# Patient Record
Sex: FEMALE | Race: WHITE | Employment: STUDENT | URBAN - METROPOLITAN AREA
[De-identification: names, ages, dates, MRNs, and addresses within clinical notes are randomized per-mention and may not be internally consistent; named-entity substitution may affect disease eponyms.]

---

## 2018-09-21 ENCOUNTER — OFFICE VISIT (OUTPATIENT)
Dept: FAMILY MEDICINE CLINIC | Age: 23
End: 2018-09-21

## 2018-09-21 VITALS
HEART RATE: 62 BPM | TEMPERATURE: 98.4 F | HEIGHT: 66 IN | DIASTOLIC BLOOD PRESSURE: 66 MMHG | BODY MASS INDEX: 21.69 KG/M2 | OXYGEN SATURATION: 98 % | RESPIRATION RATE: 18 BRPM | WEIGHT: 135 LBS | SYSTOLIC BLOOD PRESSURE: 88 MMHG

## 2018-09-21 DIAGNOSIS — F41.9 ANXIETY AND DEPRESSION: ICD-10-CM

## 2018-09-21 DIAGNOSIS — D69.0 HENOCH-SCHONLEIN PURPURA (HCC): ICD-10-CM

## 2018-09-21 DIAGNOSIS — Z23 ENCOUNTER FOR IMMUNIZATION: ICD-10-CM

## 2018-09-21 DIAGNOSIS — G43.809 OTHER MIGRAINE WITHOUT STATUS MIGRAINOSUS, NOT INTRACTABLE: ICD-10-CM

## 2018-09-21 DIAGNOSIS — F32.A ANXIETY AND DEPRESSION: ICD-10-CM

## 2018-09-21 DIAGNOSIS — R94.6 BORDERLINE ABNORMAL TFTS: Primary | ICD-10-CM

## 2018-09-21 PROBLEM — G43.909 MIGRAINE: Status: ACTIVE | Noted: 2018-09-21

## 2018-09-21 RX ORDER — BUPROPION HYDROCHLORIDE 150 MG/1
150 TABLET ORAL
COMMUNITY
End: 2019-12-13

## 2018-09-21 RX ORDER — KETOROLAC TROMETHAMINE 10 MG/1
TABLET, FILM COATED ORAL
COMMUNITY
Start: 2015-03-27 | End: 2018-09-21

## 2018-09-21 RX ORDER — BUPROPION HYDROCHLORIDE 100 MG/1
150 TABLET ORAL DAILY
COMMUNITY
End: 2018-09-21

## 2018-09-21 RX ORDER — SUMATRIPTAN 25 MG/1
25 TABLET, FILM COATED ORAL
Qty: 9 TAB | Refills: 1 | Status: SHIPPED | OUTPATIENT
Start: 2018-09-21 | End: 2018-09-21

## 2018-09-21 RX ORDER — SUMATRIPTAN 25 MG/1
25 TABLET, FILM COATED ORAL
COMMUNITY
End: 2018-09-21 | Stop reason: SDUPTHER

## 2018-09-21 NOTE — ASSESSMENT & PLAN NOTE
resolved No results found for: WBC, WBCT, WBCPOC, HGB, HGBPOC, HCT, HCTPOC, PLT, PLTPOC, CREA, CREAPOC, BUN, IBUN, BUNPOC, K, KPOCT, INR, PTP, PTINR, PTEXT, HGBEXT, HCTEXT, PLTEXT, PTEXT

## 2018-09-21 NOTE — PROGRESS NOTES
Patient Name: Umang MRN: [id number] SUBJECTIVE Umang is a 24 y.o. female who presents with the following: Here to establish care with new PCP. Patient has recently relocated from [place] to [place]. Previously in [place] for [address]. She had a hypothyroid workup in the past year after having abnormal T3 and T4 but normal TSH. She had a normal FSH/LH and prolactin. Subsequently she had an MRI of the brain done on 1/18/18 which was within normal limits regarding the pituitary. He does report some intermittent fatigue but is unsure if that is due to thyroid issues or dizzy work life schedule. Recently had cholesterol and diabetes screening done in the past year. Has upcoming GYN appointment this afternoon. Has a history of irregular menses. Currently doing well on Wellbutrin 150 mg daily for her anxiety/depression. Does have a history of migraines which is well controlled with as needed Imitrex, which she takes about 3 times a month. Review of Systems Constitutional: Positive for malaise/fatigue. Negative for chills, fever and weight loss. HENT: Negative for hearing loss, nosebleeds and sore throat. Respiratory: Negative for cough, sputum production, shortness of breath and wheezing. Cardiovascular: Negative for chest pain, palpitations, leg swelling and PND. Gastrointestinal: Negative for abdominal pain, blood in stool, constipation, diarrhea, nausea and vomiting. Genitourinary: Negative for dysuria, frequency and urgency. Musculoskeletal: Negative for back pain, falls, joint pain, myalgias and neck pain. Skin: Negative for itching and rash. Neurological: Negative for dizziness, sensory change, focal weakness and loss of consciousness. Psychiatric/Behavioral: Negative for depression. The patient is not nervous/anxious. All other systems reviewed and are negative. The patient's medications, allergies, past medical history, surgical history, family history and social history were reviewed and updated where appropriate. Prior to Admission medications Medication Sig Start Date End Date Taking? Authorizing Provider buPROPion (WELLBUTRIN) 100 mg tablet Take 150 mg by mouth daily. Yes Historical Provider SUMAtriptan (IMITREX) 25 mg tablet Take 25 mg by mouth once as needed for Migraine. Yes Historical Provider No Known Allergies Past Medical History:  
Diagnosis Date  Acne 3/9/2015 Dermatologist Dr Howard Dumont middle and high school (BCP's x 3 yrs); Accutane 2010 x 6 months  Anxiety and depression 9/21/2018  H/O Henoch-Schonlein purpura 12/30/2014 Reaction to strep at age 9 yo  Irregular menses 12/30/2014 Followed by Tangela Capone  Migraine 9/21/2018 Past Surgical History:  
Procedure Laterality Date  HX TONSILLECTOMY  2004 Age 7  
 HX WISDOM TEETH EXTRACTION Bilateral 7/2014 Family History Problem Relation Age of Onset  No Known Problems Mother  No Known Problems Father  Diabetes Maternal Grandmother   
  type 2  
 Colon Cancer Maternal Grandmother   
  diagnosed in her 63's, survivor  Thyroid Disease Maternal Grandmother   
  hypothyroid  Colon Cancer Paternal Aunt   
  diagnosed in her 63's, survivor Social History Social History  Marital status: SINGLE Spouse name: N/A  
 Number of children: 0  
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  
 Sexual activity: Yes  
  Partners: Male Birth control/ protection: Condom Other Topics Concern  Caffeine Concern No  
 Weight Concern No  
  stable since beginning of 2014, maintaining; has intentionally lost 15 lbs since starting college in 2012  Special Diet Yes  
  gluten free diet since 6/2013  Exercise Yes  
  2-3 x week: runs or other cardio Social History Narrative Previous Peds Dr Amy Santillan OBJECTIVE Visit Vitals  BP (!) 88/66 (BP 1 Location: Right arm, BP Patient Position: Sitting)  Pulse 62  Temp 98.4 °F (36.9 °C) (Oral)  Resp 18  Ht 5' 5.5\" (1.664 m)  Wt 135 lb (61.2 kg)  SpO2 98%  BMI 22.12 kg/m2 Physical Exam  
Constitutional: She is oriented to person, place, and time and well-developed, well-nourished, and in no distress. No distress. Eyes: Conjunctivae and EOM are normal. Pupils are equal, round, and reactive to light. Neck: Normal range of motion. Neck supple. No thyromegaly present. Cardiovascular: Normal rate, regular rhythm and normal heart sounds. Exam reveals no gallop and no friction rub. No murmur heard. Pulmonary/Chest: Effort normal and breath sounds normal. No respiratory distress. She has no wheezes. Neurological: She is alert and oriented to person, place, and time. Skin: Skin is warm and dry. No rash noted. She is not diaphoretic. Psychiatric: Mood, memory, affect and judgment normal.  
Nursing note and vitals reviewed. ASSESSMENT AND PLAN Annamarie Dorsey is a 24 y.o. female who presents today for: 
 
1. Borderline abnormal TFTs Stable, continue current treatment pending review of labs. - TSH AND FREE T4 
- T3 TOTAL 
- THYROID ANTIBODY PANEL 2. Other migraine without status migrainosus, not intractable Stable, continue current treatment. 
- SUMAtriptan (IMITREX) 25 mg tablet; Take 1 Tab by mouth once as needed for Migraine for up to 1 dose. Dispense: 9 Tab; Refill: 1 3. Anxiety and depression Stable, continue current treatment. - buPROPion XL (WELLBUTRIN XL) 150 mg tablet; Take 150 mg by mouth every morning. 4. Encounter for immunization - Influenza virus vaccine (QUADRIVALENT PRES FREE SYRINGE) IM (33751) - [place] IMMUNIZ ADMIN,1 SINGLE/COMB VAC/TOXOID Medications Discontinued During This Encounter Medication Reason  norgestimate-ethinyl estradiol (ORTHO TRI-CYCLEN LO, 28,) 0.18/0.215/0.25 mg-25 mcg (28) tablet Not A Current Medication  ketorolac (TORADOL) 10 mg tablet Not A Current Medication  buPROPion (WELLBUTRIN) 100 mg tablet Not A Current Medication  SUMAtriptan (IMITREX) 25 mg tablet Reorder Follow-up Disposition: 
Return in about 1 year (around 9/21/2019) for CPE (30 min). Medication risks/benefits/costs/interactions/alternatives discussed with patient. Advised patient to call back or return to office if symptoms worsen/change/persist. If patient cannot reach us or should anything more severe/urgent arise he/she should proceed directly to the nearest emergency department. Discussed expected course/resolution/complications of diagnosis in detail with patient. Patient given a written after visit summary which includes his/her diagnoses, current medications and vitals. Patient expressed understanding with the diagnosis and plan. Coni Dang M.D. Diagnoses and all orders for this visit: 1. Borderline abnormal TFTs 
-     TSH AND FREE T4 
-     T3 TOTAL 
-     THYROID ANTIBODY PANEL 2. Other migraine without status migrainosus, not intractable -     SUMAtriptan (IMITREX) 25 mg tablet; Take 1 Tab by mouth once as needed for Migraine for up to 1 dose. 3. Anxiety and depression 4. Encounter for immunization -     Influenza virus vaccine (QUADRIVALENT PRES FREE SYRINGE) IM (29495) -     [place] IMMUNIZ ADMIN,1 SINGLE/COMB VAC/TOXOID 5. H/O Henoch-Schonlein Purpura Assessment & Plan: 
resolved No results found for: WBC, WBCT, WBCPOC, HGB, HGBPOC, HCT, HCTPOC, PLT, PLTPOC, CREA, CREAPOC, BUN, IBUN, BUNPOC, K, KPOCT, INR, PTP, PTINR, PTEXT, HGBEXT, HCTEXT, PLTEXT, PTEXT

## 2018-09-21 NOTE — PROGRESS NOTES
Chief Complaint Patient presents with [address] [place] [address]  Thyroid Problem  
  follow up 1. Have you been to the ER, urgent care clinic since your last visit? Hospitalized since your last visit? No 
 
2. Have you seen or consulted any other health care providers outside of the [address] [place] [address] since your last visit? Include any pap smears or colon screening. No 
Patient stated that she has a pap today at [facility].

## 2018-09-21 NOTE — MR AVS SNAPSHOT
[address] 
[phone] Patient: Patricia Aden MRN: [id number] MKY:[id number] Visit Information Date & Time Provider Department Dept. Phone Encounter #  
 9/21/2018 10:00 AM Donaldo Wilde MD [address] [place] [address] [phone] [id number] Upcoming Health Maintenance Date Due  
 HPV Age 9Y-34Y (1 of 1 - Female 3 Dose Series) 3/1/2005 DTaP/Tdap/Td series (1 - Tdap) 3/1/2015 PAP AKA CERVICAL CYTOLOGY 3/1/2015 Allergies as of 9/21/2018  Review Complete On: 9/21/2018 By: Donaldo Wilde MD  
 No Known Allergies Current Immunizations  Reviewed on 12/30/2014 Name Date Influenza Vaccine 11/17/2014 Influenza Vaccine (Quad) PF 9/21/2018 Not reviewed this visit You Were Diagnosed With   
  
 Codes Comments Encounter for immunization    -  Primary ICD-10-CM: Q66 ICD-9-CM: V03.89 Borderline abnormal TFTs     ICD-10-CM: R94.6 ICD-9-CM: 794.5 Vitals BP Pulse Temp Resp Height(growth percentile) Weight(growth percentile) (!) 88/66 (BP 1 Location: Right arm, BP Patient Position: Sitting) 62 98.4 °F (36.9 °C) (Oral) 18 5' 5.5\" (1.664 m) 135 lb (61.2 kg) SpO2 BMI OB Status Smoking Status 98% 22.12 kg/m2 Unknown Never Smoker Vitals History BMI and BSA Data Body Mass Index Body Surface Area  
 22.12 kg/m 2 1.68 m 2 Preferred Pharmacy Pharmacy Name Phone [id number] [place], [facility] [phone] Your Updated Medication List  
  
   
This list is accurate as of 9/21/18 11:16 AM.  Always use your most recent med list.  
  
  
  
  
 SUMAtriptan 25 mg tablet Commonly known as:  IMITREX Take 1 Tab by mouth once as needed for Migraine for up to 1 dose. WELLBUTRIN  mg tablet Generic drug:  buPROPion XL Take 150 mg by mouth every morning. Prescriptions Sent to Pharmacy Refills SUMAtriptan (IMITREX) 25 mg tablet 1 Sig: Take 1 Tab by mouth once as needed for Migraine for up to 1 dose. Class: Normal  
 Pharmacy: [facility] #: [phone] Route: Oral  
  
We Performed the Following INFLUENZA VIRUS VAC QUAD,SPLIT,PRESV FREE SYRINGE IM [id number] CPT(R)] [place] IMMUNIZ ADMIN,1 SINGLE/COMB VAC/TOXOID [id number] CPT(R)]   
 T3 TOTAL [id number] CPT(R)] THYROID ANTIBODY PANEL [44834 CPT(R)] TSH AND FREE T4 [79876 CPT(R)] Introducing [facility]! [facility] introduces [employer] patient portal. Now you can access parts of your medical record, email your doctor's office, and request medication refills online. 1. In your internet browser, go to [email or web address]. [email or web address] 2. Click on the First Time User? Click Here link in the Sign In box. You will see the New Member Sign Up page. 3. Enter your [employer] Access Code exactly as it appears below. You will not need to use this code after youve completed the sign-up process. If you do not sign up before the expiration date, you must request a new code. · [employer] Access Code: [email or web address] Expires: 12/20/2018 11:06 AM 
 
4. Enter the last four digits of your Social Security Number (xxxx) and Date of Birth (mm/dd/yyyy) as indicated and click Submit. You will be taken to the next sign-up page. 5. Create a QuicklyChatt ID. This will be your [employer] login ID and cannot be changed, so think of one that is secure and easy to remember. 6. Create a [employer] password. You can change your password at any time. 7. Enter your Password Reset Question and Answer. This can be used at a later time if you forget your password. 8. Enter your e-mail address. You will receive e-mail notification when new information is available in [address] 9. Click Sign Up. You can now view and download portions of your medical record. 10. Click the Download Summary menu link to download a portable copy of your medical information. If you have questions, please visit the Frequently Asked Questions section of the [employer] website. Remember, [employer] is NOT to be used for urgent needs. For medical emergencies, dial 911. Now available from your iPhone and Android! Please provide this summary of care documentation to your next provider. Your primary care clinician is listed as Alisha Best. If you have any questions after today's visit, please call [phone].

## 2018-09-22 LAB
T3 SERPL-MCNC: 86 NG/DL (ref 71–180)
T4 FREE SERPL-MCNC: 0.89 NG/DL (ref 0.82–1.77)
THYROGLOB AB SERPL-ACNC: <1 IU/ML (ref 0–0.9)
THYROPEROXIDASE AB SERPL-ACNC: 13 IU/ML (ref 0–34)
TSH SERPL DL<=0.005 MIU/L-ACNC: 0.8 UIU/ML (ref 0.45–4.5)

## 2018-09-22 NOTE — PROGRESS NOTES
Please notify patient regarding their test results: 
 
Thyroid levels are all within normal limits. Prior abnormal thyroid levels may have been a transient change. We can plan to monitor this yearly or unless pt develops new symptoms. No further workup needed at this time.

## 2018-09-27 NOTE — PROGRESS NOTES
Patient returned previous phone call, verified  informed her of the following info. Thyroid levels are all within normal limits. Prior abnormal thyroid levels may have been a transient change. We can plan to monitor this yearly or unless pt develops new symptoms. No further workup needed at this time.

## 2019-12-13 ENCOUNTER — OFFICE VISIT (OUTPATIENT)
Dept: FAMILY MEDICINE CLINIC | Age: 24
End: 2019-12-13

## 2019-12-13 VITALS
RESPIRATION RATE: 16 BRPM | OXYGEN SATURATION: 98 % | DIASTOLIC BLOOD PRESSURE: 66 MMHG | WEIGHT: 132.2 LBS | HEART RATE: 55 BPM | TEMPERATURE: 98.4 F | BODY MASS INDEX: 22.02 KG/M2 | HEIGHT: 65 IN | SYSTOLIC BLOOD PRESSURE: 88 MMHG

## 2019-12-13 DIAGNOSIS — R42 DIZZINESS: ICD-10-CM

## 2019-12-13 DIAGNOSIS — Z13.220 SCREENING FOR LIPID DISORDERS: ICD-10-CM

## 2019-12-13 DIAGNOSIS — Z00.00 ROUTINE GENERAL MEDICAL EXAMINATION AT HEALTH CARE FACILITY: Primary | ICD-10-CM

## 2019-12-13 DIAGNOSIS — Z13.1 SCREENING FOR DIABETES MELLITUS: ICD-10-CM

## 2019-12-13 DIAGNOSIS — R94.6 BORDERLINE ABNORMAL TFTS: ICD-10-CM

## 2019-12-13 NOTE — PATIENT INSTRUCTIONS
Well Visit, Ages 25 to 48: Care Instructions Your Care Instructions Physical exams can help you stay healthy. Your doctor has checked your overall health and may have suggested ways to take good care of yourself. He or she also may have recommended tests. At home, you can help prevent illness with healthy eating, regular exercise, and other steps. Follow-up care is a key part of your treatment and safety. Be sure to make and go to all appointments, and call your doctor if you are having problems. It's also a good idea to know your test results and keep a list of the medicines you take. How can you care for yourself at home? · Reach and stay at a healthy weight. This will lower your risk for many problems, such as obesity, diabetes, heart disease, and high blood pressure. · Get at least 30 minutes of physical activity on most days of the week. Walking is a good choice. You also may want to do other activities, such as running, swimming, cycling, or playing tennis or team sports. Discuss any changes in your exercise program with your doctor. · Do not smoke or allow others to smoke around you. If you need help quitting, talk to your doctor about stop-smoking programs and medicines. These can increase your chances of quitting for good. · Talk to your doctor about whether you have any risk factors for sexually transmitted infections (STIs). Having one sex partner (who does not have STIs and does not have sex with anyone else) is a good way to avoid these infections. · Use birth control if you do not want to have children at this time. Talk with your doctor about the choices available and what might be best for you. · Protect your skin from too much sun. When you're outdoors from 10 a.m. to 4 p.m., stay in the shade or cover up with clothing and a hat with a wide brim. Wear sunglasses that block UV rays. Even when it's cloudy, put broad-spectrum sunscreen (SPF 30 or higher) on any exposed skin. · See a dentist one or two times a year for checkups and to have your teeth cleaned. · Wear a seat belt in the car. Follow your doctor's advice about when to have certain tests. These tests can spot problems early. For everyone · Cholesterol. Have the fat (cholesterol) in your blood tested after age 21. Your doctor will tell you how often to have this done based on your age, family history, or other things that can increase your risk for heart disease. · Blood pressure. Have your blood pressure checked during a routine doctor visit. Your doctor will tell you how often to check your blood pressure based on your age, your blood pressure results, and other factors. · Vision. Talk with your doctor about how often to have a glaucoma test. 
· Diabetes. Ask your doctor whether you should have tests for diabetes. · Colon cancer. Your risk for colorectal cancer gets higher as you get older. Some experts say that adults should start regular screening at age 48 and stop at age 76. Others say to start before age 48 or continue after age 76. Talk with your doctor about your risk and when to start and stop screening. For women · Breast exam and mammogram. Talk to your doctor about when you should have a clinical breast exam and a mammogram. Medical experts differ on whether and how often women under 50 should have these tests. Your doctor can help you decide what is right for you. · Cervical cancer screening test and pelvic exam. Begin with a Pap test at age 24. The test often is part of a pelvic exam. Starting at age 27, you may choose to have a Pap test, an HPV test, or both tests at the same time (called co-testing). Talk with your doctor about how often to have testing. · Tests for sexually transmitted infections (STIs). Ask whether you should have tests for STIs. You may be at risk if you have sex with more than one person, especially if your partners do not wear condoms. For men · Tests for sexually transmitted infections (STIs). Ask whether you should have tests for STIs. You may be at risk if you have sex with more than one person, especially if you do not wear a condom. · Testicular cancer exam. Ask your doctor whether you should check your testicles regularly. · Prostate exam. Talk to your doctor about whether you should have a blood test (called a PSA test) for prostate cancer. Experts differ on whether and when men should have this test. Some experts suggest it if you are older than 39 and are -American or have a father or brother who got prostate cancer when he was younger than 72. When should you call for help? Watch closely for changes in your health, and be sure to contact your doctor if you have any problems or symptoms that concern you. Where can you learn more? Go to http://flor-raymond.info/. Enter P072 in the search box to learn more about \"Well Visit, Ages 25 to 48: Care Instructions. \" Current as of: December 13, 2018 Content Version: 12.2 © 9586-8510 [employer]. Care instructions adapted under license by [employer] (which disclaims liability or warranty for this information). If you have questions about a medical condition or this instruction, always ask your healthcare professional. [address] any warranty or liability for your use of this information.

## 2019-12-13 NOTE — PROGRESS NOTES
Patient Name: Lynnette Ruvalcaba   MRN: [id number]    Frances Dailey is a 21 y.o. female who presents with the following:     Cervical Cancer Screening: UTD, done by OBGYN, records requested. CAD risk factors:  HTN: wnl  BP Readings from Last 3 Encounters:   12/13/19 (!) 88/66 09/21/18 (!) 88/66   03/09/15 (!) 82/60     Lipid: due  DM: due    States that she gets occasionally dizzy randomly at work. Snacks frequently so she does not think it is due to hypoglycemia. 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 She is always had low blood pressure. Denies chest pain, palpitations, sweating. Review of Systems   Constitutional: Negative for fever, malaise/fatigue and weight loss. Respiratory: Negative for cough, hemoptysis, shortness of breath and wheezing. Cardiovascular: Negative for chest pain, palpitations, leg swelling and PND. Gastrointestinal: Negative for abdominal pain, constipation, diarrhea, nausea and vomiting. Neurological: Positive for dizziness. The patient's medications, allergies, past medical history, surgical history, family history and social history were reviewed and updated where appropriate. Prior to Admission medications    Medication Sig Start Date End Date Taking? Authorizing Provider   levonorgestrel (KYLEENA) 17.5 mcg/24 hrs (5 yrs) 19.5 mg IUD IUD Kyleena 17.5 mcg/24 hrs (5yrs) 19.5mg intrauterine device   Take by intrauterine route. Yes Provider, Historical   buPROPion XL (WELLBUTRIN XL) 150 mg tablet Take 150 mg by mouth every morning. Provider, Historical       No Known Allergies      OBJECTIVE    Visit Vitals  BP (!) 88/66 (BP 1 Location: Right arm, BP Patient Position: Sitting)   Pulse (!) 55   Temp 98.4 °F (36.9 °C) (Oral)   Resp 16   Ht 5' 5\" (1.651 m)   Wt 132 lb 3.2 oz (60 kg)   SpO2 98%   BMI 22.00 kg/m²       Physical Exam  Constitutional:       General: She is not in acute distress. Appearance: She is not diaphoretic. HENT:      Head: Normocephalic. Right Ear: External ear normal.      Left Ear: External ear normal.   Eyes:      Conjunctiva/sclera: Conjunctivae normal.      Pupils: Pupils are equal, round, and reactive to light. Neck:      Thyroid: No thyroid mass, thyromegaly or thyroid tenderness. Cardiovascular:      Rate and Rhythm: Normal rate and regular rhythm. Heart sounds: Normal heart sounds. No murmur. No friction rub. No gallop. Pulmonary:      Effort: Pulmonary effort is normal. No respiratory distress. Breath sounds: Normal breath sounds. No wheezing or rales. Abdominal:      General: Bowel sounds are normal. There is no distension. Palpations: Abdomen is soft. Tenderness: There is no tenderness. There is no guarding or rebound. Skin:     General: Skin is warm and dry. Neurological:      Mental Status: She is alert and oriented to person, place, and time. Psychiatric:         Mood and Affect: Affect normal.         Cognition and Memory: Memory normal.         Judgment: Judgment normal.           ASSESSMENT AND PLAN  [address] Valent[address] Casey is a 21 y.o. female who presents today for:    1. Routine general medical examination at health care facility  Reviewed age appropriate screening tests as recommended by the USPSTF Preventive Services Database with patient today. - CBC W/O DIFF    2. Screening for diabetes mellitus  - HEMOGLOBIN A1C WITH EAG    3. Screening for lipid disorders  - METABOLIC PANEL, COMPREHENSIVE  - LIPID PANEL    4. Borderline abnormal TFTs  - TSH AND FREE T4    5. Dizziness  Recommend increasing water and salt intake. Continue with frequent small meals. Continue to monitor. Medications Discontinued During This Encounter   Medication Reason    buPROPion XL (WELLBUTRIN XL) 150 mg tablet        Follow-up and Dispositions    · Return in about 1 year (around 12/13/2020) for CPE (30 min).          Medication risks/benefits/costs/interactions/alternatives discussed with patient. Advised patient to call back or return to office if symptoms worsen/change/persist. If patient cannot reach us or should anything more severe/urgent arise he/she should proceed directly to the nearest emergency department. Discussed expected course/resolution/complications of diagnosis in detail with patient. Patient given a written after visit summary which includes his/her diagnoses, current medications and vitals. Patient expressed understanding with the diagnosis and plan. Alisha Power M.D.

## 2019-12-13 NOTE — PROGRESS NOTES
Chief Complaint   Patient presents with    Complete Physical     not fasting - last pap 1 month ago     1. Have you been to the ER, urgent care clinic since your last visit? Hospitalized since your last visit? No    2. Have you seen or consulted any other health care providers outside of the [address] [place] [address] since your last visit? Include any pap smears or colon screening.  No

## 2019-12-22 LAB
ALBUMIN SERPL-MCNC: 5.1 G/DL (ref 3.5–5.5)
ALBUMIN/GLOB SERPL: 2.7 {RATIO} (ref 1.2–2.2)
ALP SERPL-CCNC: 41 IU/L (ref 39–117)
ALT SERPL-CCNC: 18 IU/L (ref 0–32)
AST SERPL-CCNC: 19 IU/L (ref 0–40)
BILIRUB SERPL-MCNC: 0.7 MG/DL (ref 0–1.2)
BUN SERPL-MCNC: 6 MG/DL (ref 6–20)
BUN/CREAT SERPL: 6 (ref 9–23)
CALCIUM SERPL-MCNC: 10.1 MG/DL (ref 8.7–10.2)
CHLORIDE SERPL-SCNC: 98 MMOL/L (ref 96–106)
CHOLEST SERPL-MCNC: 168 MG/DL (ref 100–199)
CO2 SERPL-SCNC: 25 MMOL/L (ref 20–29)
CREAT SERPL-MCNC: 0.97 MG/DL (ref 0.57–1)
ERYTHROCYTE [DISTWIDTH] IN BLOOD BY AUTOMATED COUNT: 12.2 % (ref 12.3–15.4)
EST. AVERAGE GLUCOSE BLD GHB EST-MCNC: 94 MG/DL
GLOBULIN SER CALC-MCNC: 1.9 G/DL (ref 1.5–4.5)
GLUCOSE SERPL-MCNC: 83 MG/DL (ref 65–99)
HBA1C MFR BLD: 4.9 % (ref 4.8–5.6)
HCT VFR BLD AUTO: 41.5 % (ref 34–46.6)
HDLC SERPL-MCNC: 76 MG/DL
HGB BLD-MCNC: 13.6 G/DL (ref 11.1–15.9)
INTERPRETATION, 910389: NORMAL
LDLC SERPL CALC-MCNC: 72 MG/DL (ref 0–99)
MCH RBC QN AUTO: 30.2 PG (ref 26.6–33)
MCHC RBC AUTO-ENTMCNC: 32.8 G/DL (ref 31.5–35.7)
MCV RBC AUTO: 92 FL (ref 79–97)
PLATELET # BLD AUTO: 252 X10E3/UL (ref 150–450)
POTASSIUM SERPL-SCNC: 4.5 MMOL/L (ref 3.5–5.2)
PROT SERPL-MCNC: 7 G/DL (ref 6–8.5)
RBC # BLD AUTO: 4.51 X10E6/UL (ref 3.77–5.28)
SODIUM SERPL-SCNC: 137 MMOL/L (ref 134–144)
T4 FREE SERPL-MCNC: 0.93 NG/DL (ref 0.82–1.77)
TRIGL SERPL-MCNC: 101 MG/DL (ref 0–149)
TSH SERPL DL<=0.005 MIU/L-ACNC: 1.06 UIU/ML (ref 0.45–4.5)
VLDLC SERPL CALC-MCNC: 20 MG/DL (ref 5–40)
WBC # BLD AUTO: 6.2 X10E3/UL (ref 3.4–10.8)

## 2019-12-23 NOTE — PROGRESS NOTES
Please send a letter with the following: Your screening tests were normal; you do not have diabetes or high cholesterol.  Thyroid tests are also normal.

## 2020-07-24 ENCOUNTER — TELEPHONE (OUTPATIENT)
Dept: FAMILY MEDICINE CLINIC | Age: 25
End: 2020-07-24

## 2020-07-24 DIAGNOSIS — N30.90 CYSTITIS: Primary | ICD-10-CM

## 2020-07-24 RX ORDER — SULFAMETHOXAZOLE AND TRIMETHOPRIM 800; 160 MG/1; MG/1
1 TABLET ORAL 2 TIMES DAILY
Qty: 10 TAB | Refills: 0 | Status: SHIPPED | OUTPATIENT
Start: 2020-07-24 | End: 2020-07-29

## 2020-07-24 NOTE — TELEPHONE ENCOUNTER
Return call to patient. C/o dysuria x 3 day. OTC urine dip stick positive for leukocytes. Rx for bactrim sent. OV INI.